# Patient Record
Sex: FEMALE | Race: WHITE | URBAN - METROPOLITAN AREA
[De-identification: names, ages, dates, MRNs, and addresses within clinical notes are randomized per-mention and may not be internally consistent; named-entity substitution may affect disease eponyms.]

---

## 2017-01-04 ENCOUNTER — OFFICE VISIT (OUTPATIENT)
Dept: FAMILY MEDICINE | Facility: CLINIC | Age: 37
End: 2017-01-04
Payer: COMMERCIAL

## 2017-01-04 VITALS
DIASTOLIC BLOOD PRESSURE: 71 MMHG | SYSTOLIC BLOOD PRESSURE: 105 MMHG | HEIGHT: 68 IN | TEMPERATURE: 98.5 F | HEART RATE: 90 BPM | WEIGHT: 178 LBS | BODY MASS INDEX: 26.98 KG/M2 | OXYGEN SATURATION: 97 %

## 2017-01-04 DIAGNOSIS — J02.0 STREP THROAT: Primary | ICD-10-CM

## 2017-01-04 DIAGNOSIS — R09.81 NASAL CONGESTION: ICD-10-CM

## 2017-01-04 PROCEDURE — 99213 OFFICE O/P EST LOW 20 MIN: CPT | Performed by: PHYSICIAN ASSISTANT

## 2017-01-04 RX ORDER — CEPHALEXIN 500 MG/1
500 CAPSULE ORAL 2 TIMES DAILY
COMMUNITY

## 2017-01-04 NOTE — NURSING NOTE
"Chief Complaint   Patient presents with     Pharyngitis       Initial /71 mmHg  Pulse 90  Temp(Src) 98.5  F (36.9  C) (Oral)  Ht 5' 8\" (1.727 m)  Wt 178 lb (80.74 kg)  BMI 27.07 kg/m2  SpO2 97%  LMP 12/21/2016 Estimated body mass index is 27.07 kg/(m^2) as calculated from the following:    Height as of this encounter: 5' 8\" (1.727 m).    Weight as of this encounter: 178 lb (80.74 kg).  BP completed using cuff size: nay Muñoz CMA      "

## 2017-01-04 NOTE — PROGRESS NOTES
"  SUBJECTIVE:                                                    Carley Fenton is a 36 year old female who presents to clinic today for the following health issues:      Patient had positive step test last Wednesday and is still not feeling better.     Patient was seen last Wednesday and given BID Keflex for 10 days. She notes she is not feeling better yet.   Her kids were also positive for strep.   Kids are feeling better. She isn't.   Now with nasal congestion and mucus, sinus pressure.     Problem list and histories reviewed & adjusted, as indicated.  Additional history: as documented    Problem list, Medication list, Allergies, and Medical/Social/Surgical histories reviewed in [facility] and updated as appropriate.    ROS:  Constitutional, HEENT, cardiovascular, pulmonary, gi and gu systems are negative, except as otherwise noted.    OBJECTIVE:                                                    /71 mmHg  Pulse 90  Temp(Src) 98.5  F (36.9  C) (Oral)  Ht 5' 8\" (1.727 m)  Wt 178 lb (80.74 kg)  BMI 27.07 kg/m2  SpO2 97%  LMP 12/21/2016  Body mass index is 27.07 kg/(m^2).  GENERAL: healthy, alert and no distress  HENT: ear canals and TM's normal, nose and mouth without ulcers or lesions  NECK: no adenopathy, no asymmetry, masses, or scars and thyroid normal to palpation  RESP: lungs clear to auscultation - no rales, rhonchi or wheezes  CV: regular rate and rhythm, normal S1 S2, no S3 or S4, no murmur, click or rub, no peripheral edema and peripheral pulses strong    Diagnostic Test Results:  none      ASSESSMENT/PLAN:                                                        ICD-10-CM    1. Strep throat J02.0    2. Nasal congestion R09.81    We discussed finishing her treatment for strep throat. Rapid strep not done as on antibiotics. Discussed likely a viral and can add mucinex.     FUTURE APPOINTMENTS:       - Follow-up for annual visit or as needed    Laura Dickens PA-C  [facility]      "

## 2017-01-04 NOTE — MR AVS SNAPSHOT
"              After Visit Summary   1/4/2017    Carley Fenton    MRN: [id number]           Patient Information     Date Of Birth          1980        Visit Information        Provider Department      1/4/2017 10:00 AM Laura Dickens PA-C [facility]         Follow-ups after your visit        Who to contact     If you have questions or need follow up information about today's clinic visit or your schedule please contact [facility] directly at [phone].  Normal or non-critical lab and imaging results will be communicated to you by MyChart, letter or phone within 4 business days after the clinic has received the results. If you do not hear from us within 7 days, please contact the clinic through [employer] or phone. If you have a critical or abnormal lab result, we will notify you by phone as soon as possible.  Submit refill requests through [facility] or call your pharmacy and they will forward the refill request to us. Please allow 3 business days for your refill to be completed.          Additional Information About Your Visit        MyChart Information     [facility] gives you secure access to your electronic health record. If you see a primary care provider, you can also send messages to your care team and make appointments. If you have questions, please call your primary care clinic.  If you do not have a primary care provider, please call [phone] and they will assist you.        Care EveryWhere ID     This is your Care EveryWhere ID. This could be used by other organizations to access your [place] medical records  [id number]        Your Vitals Were     Pulse Temperature Height BMI (Body Mass Index) Pulse Oximetry Last Period    90 98.5  F (36.9  C) (Oral) 5' 8\" (1.727 m) 27.07 kg/m2 97% 12/21/2016       Blood Pressure from Last 3 Encounters:   01/04/17 105/71   12/31/14 113/77   07/19/12 108/66    Weight from Last 3 Encounters:   01/04/17 178 lb (80.74 kg) "   07/19/12 168 lb 12.8 oz (76.567 kg)   05/30/12 172 lb (78.019 kg)              Today, you had the following     No orders found for display       Primary Care Provider Office Phone # Fax #    Barbara Sylvester [phone]       [facility] [address] [address]        Thank you!     Thank you for choosing [facility]  for your care. Our goal is always to provide you with excellent care. Hearing back from our patients is one way we can continue to improve our services. Please take a few minutes to complete the written survey that you may receive in the mail after your visit with us. Thank you!             Your Updated Medication List - Protect others around you: Learn how to safely use, store and throw away your medicines at www.disposemymeds.org.          This list is accurate as of: 1/4/17 10:09 AM.  Always use your most recent med list.                   Brand Name Dispense Instructions for use    cephALEXin 500 MG capsule    KEFLEX     Take 500 mg by mouth 2 times daily

## 2017-04-03 ENCOUNTER — TELEPHONE (OUTPATIENT)
Dept: FAMILY MEDICINE | Facility: CLINIC | Age: 37
End: 2017-04-03

## 2017-04-03 NOTE — TELEPHONE ENCOUNTER
Panel Management Review      Patient has the following on her problem list: None      Composite cancer screening  Chart review shows that this patient is due/due soon for the following Pap Smear  Summary:    Patient is due/failing the following:   PAP    Action needed:   Patient needs office visit for physical with pap screen.    Type of outreach:    Phone, left message for patient to call back.     Questions for provider review:    None                                                                                                                                    JOE Hook MA       Chart routed to Care Team .

## 2017-04-03 NOTE — LETTER
April 3, 2017    Carley Fenton  [address] [address]    Dear Carley    We care about your health and have reviewed your health plan. We have reviewed your medical conditions, medication list, and lab results and are making recommendations based on this review, to better manage your health.    You are in particular need of attention regarding:  - Scheduling a Physical with a Cervical Cancer Screening (Pap Smear) age 64 and younger [phone]      Here is a list of Health Maintenance topics that are due now or due soon:  Health Maintenance Due   Topic Date Due     LIPID MONITORING Q1 YEAR( NO INBASKET)  08/15/2013     PAP Q3 YR  09/01/2014     We will be calling you in the next couple of weeks to help you schedule any appointments that are needed.  Please call us at [phone] (or use [employer]) to address the above recommendations.     Thank you for trusting [facility] and we appreciate the opportunity to serve you.  We look forward to supporting your healthcare needs in the future.    Healthy Regards,    KELLEY Alford

## 2017-07-18 ENCOUNTER — TELEPHONE (OUTPATIENT)
Dept: FAMILY MEDICINE | Facility: CLINIC | Age: 37
End: 2017-07-18

## 2017-07-18 NOTE — LETTER
July 24, 2017    Carley Fenton  [address] [address]      Dear Carley Fenton,     We have tried to contact you about your health, but have been unable to reach you.  Please call us as soon as possible so we can provide you with the best care possible.  We will continue to check in with you throughout the year to complete these items of care, if you are not able to complete these items at this time.  If you would like to complete the missing items for your care, please contact us at [phone].    We recommend the following:  -schedule a PAP SMEAR EXAM which is due.  Please disregard this reminder if you have had this exam elsewhere within the last year.  It would be helpful for us to have a copy of your recent pap smear report in our file so that we can best coordinate your care.    Sincerely,     Your Care Team at [place]      Gudelia Raygoza CNP/[place]

## 2017-10-15 ENCOUNTER — HEALTH MAINTENANCE LETTER (OUTPATIENT)
Age: 37
End: 2017-10-15

## 2017-11-06 ENCOUNTER — TELEPHONE (OUTPATIENT)
Dept: FAMILY MEDICINE | Facility: CLINIC | Age: 37
End: 2017-11-06

## 2017-11-06 NOTE — LETTER
November 6, 2017    Carley Fenton  [address] [address]    Dear Carley    We care about your health and have reviewed your health plan. We have reviewed your medical conditions, medication list, and lab results and are making recommendations based on this review, to better manage your health.    You are in particular need of attention regarding:  - Scheduling a Physical with a Cervical Cancer Screening (Pap Smear) age 64 and younger [phone]      Here is a list of Health Maintenance topics that are due now or due soon:  Health Maintenance Due   Topic Date Due     LIPID MONITORING Q1 YEAR  08/15/2013     PAP Q3 YR  09/01/2014     INFLUENZA VACCINE (SYSTEM ASSIGNED)  09/01/2017     We will be calling you in the next couple of weeks to help you schedule any appointments that are needed.  Please call us at [phone] (or use [employer]) to address the above recommendations.     Thank you for trusting [facility] and we appreciate the opportunity to serve you.  We look forward to supporting your healthcare needs in the future.    Healthy Regards,    KELLEY Alford/CM

## 2017-11-06 NOTE — LETTER
November 13, 2017    Carley Fenton  [address] [address]      Dear Carley Fenton,     We have tried to contact you about your health, but have been unable to reach you.  Please call us as soon as possible so we can provide you with the best care possible.  We will continue to check in with you throughout the year to complete these items of care, if you are not able to complete these items at this time.  If you would like to complete the missing items for your care, please contact us at [phone].    We recommend the following:  -schedule a PAP SMEAR EXAM which is due.  Please disregard this reminder if you have had this exam elsewhere within the last year.  It would be helpful for us to have a copy of your recent pap smear report in our file so that we can best coordinate your care.    Sincerely,     Your Care Team at [place]    KELLEY Alford/[place]

## 2020-02-24 ENCOUNTER — HEALTH MAINTENANCE LETTER (OUTPATIENT)
Age: 40
End: 2020-02-24

## 2020-12-13 ENCOUNTER — HEALTH MAINTENANCE LETTER (OUTPATIENT)
Age: 40
End: 2020-12-13

## 2021-04-17 ENCOUNTER — HEALTH MAINTENANCE LETTER (OUTPATIENT)
Age: 41
End: 2021-04-17

## 2021-09-26 ENCOUNTER — HEALTH MAINTENANCE LETTER (OUTPATIENT)
Age: 41
End: 2021-09-26

## 2022-05-08 ENCOUNTER — HEALTH MAINTENANCE LETTER (OUTPATIENT)
Age: 42
End: 2022-05-08

## 2023-01-14 ENCOUNTER — HEALTH MAINTENANCE LETTER (OUTPATIENT)
Age: 43
End: 2023-01-14

## 2023-06-02 ENCOUNTER — HEALTH MAINTENANCE LETTER (OUTPATIENT)
Age: 43
End: 2023-06-02

## 2024-02-11 ENCOUNTER — HEALTH MAINTENANCE LETTER (OUTPATIENT)
Age: 44
End: 2024-02-11
